# Patient Record
Sex: MALE | Race: WHITE | Employment: FULL TIME | URBAN - METROPOLITAN AREA
[De-identification: names, ages, dates, MRNs, and addresses within clinical notes are randomized per-mention and may not be internally consistent; named-entity substitution may affect disease eponyms.]

---

## 2017-02-13 PROBLEM — J43.0 UNILATERAL EMPHYSEMA (HCC): Chronic | Status: ACTIVE | Noted: 2017-02-13

## 2018-03-12 PROBLEM — N40.1 BPH ASSOCIATED WITH NOCTURIA: Status: ACTIVE | Noted: 2018-03-12

## 2018-03-12 PROBLEM — R35.1 BPH ASSOCIATED WITH NOCTURIA: Status: ACTIVE | Noted: 2018-03-12

## 2018-04-26 PROBLEM — J42 CHRONIC BRONCHITIS (HCC): Status: ACTIVE | Noted: 2018-04-26

## 2018-05-03 PROBLEM — E11.3291 MILD NONPROLIFERATIVE DIABETIC RETINOPATHY OF RIGHT EYE WITHOUT MACULAR EDEMA ASSOCIATED WITH TYPE 2 DIABETES MELLITUS (HCC): Status: ACTIVE | Noted: 2018-05-03

## 2018-08-14 PROBLEM — J96.11 CHRONIC RESPIRATORY FAILURE WITH HYPOXIA (HCC): Status: ACTIVE | Noted: 2018-08-14

## 2018-12-18 PROCEDURE — 81003 URINALYSIS AUTO W/O SCOPE: CPT | Performed by: UROLOGY

## 2019-03-21 PROBLEM — D49.4 BLADDER TUMOR: Status: ACTIVE | Noted: 2019-03-21

## 2019-04-02 PROBLEM — C67.2 CANCER OF LATERAL WALL OF URINARY BLADDER (HCC): Status: ACTIVE | Noted: 2019-03-21

## 2020-01-03 PROCEDURE — 88305 TISSUE EXAM BY PATHOLOGIST: CPT | Performed by: UROLOGY

## 2020-02-20 PROBLEM — D69.6 THROMBOCYTOPENIA, UNSPECIFIED (HCC): Status: ACTIVE | Noted: 2020-02-20

## 2020-02-20 PROBLEM — I48.91 ATRIAL FIBRILLATION, UNSPECIFIED TYPE (HCC): Status: ACTIVE | Noted: 2020-02-20

## 2021-12-30 PROBLEM — J96.11 CHRONIC RESPIRATORY FAILURE WITH HYPOXIA (HCC): Status: RESOLVED | Noted: 2018-08-14 | Resolved: 2021-12-30

## 2022-07-23 ENCOUNTER — LAB ENCOUNTER (OUTPATIENT)
Dept: LAB | Facility: HOSPITAL | Age: 82
End: 2022-07-23
Attending: PHYSICIAN ASSISTANT
Payer: MEDICARE

## 2022-07-23 DIAGNOSIS — Z20.822 ENCOUNTER FOR PREPROCEDURE SCREENING LABORATORY TESTING FOR COVID-19: ICD-10-CM

## 2022-07-23 DIAGNOSIS — Z01.812 ENCOUNTER FOR PREPROCEDURE SCREENING LABORATORY TESTING FOR COVID-19: ICD-10-CM

## 2022-07-23 LAB — SARS-COV-2 RNA RESP QL NAA+PROBE: NOT DETECTED

## 2022-07-26 ENCOUNTER — HOSPITAL ENCOUNTER (OUTPATIENT)
Dept: CT IMAGING | Facility: HOSPITAL | Age: 82
Discharge: HOME | End: 2022-07-26
Attending: PHYSICIAN ASSISTANT
Payer: MEDICARE

## 2022-07-26 ENCOUNTER — HOSPITAL ENCOUNTER (OUTPATIENT)
Dept: GENERAL RADIOLOGY | Facility: HOSPITAL | Age: 82
Discharge: HOME | End: 2022-07-26
Attending: PHYSICIAN ASSISTANT
Payer: MEDICARE

## 2022-07-26 VITALS
HEART RATE: 89 BPM | BODY MASS INDEX: 31.71 KG/M2 | OXYGEN SATURATION: 93 % | DIASTOLIC BLOOD PRESSURE: 83 MMHG | WEIGHT: 219 LBS | SYSTOLIC BLOOD PRESSURE: 152 MMHG | HEIGHT: 69.5 IN

## 2022-07-26 DIAGNOSIS — M21.372 LEFT FOOT DROP: ICD-10-CM

## 2022-07-26 PROCEDURE — 72132 CT LUMBAR SPINE W/DYE: CPT | Performed by: PHYSICIAN ASSISTANT

## 2022-07-26 PROCEDURE — 62304 MYELOGRAPHY LUMBAR INJECTION: CPT | Performed by: PHYSICIAN ASSISTANT

## 2022-07-26 NOTE — IMAGING NOTE
S; SCHEDULED L. MYELOGRAM  B;  NKDA. HX HTN & DM ON MEDS, A-FIB HAS A PACEMAKER. A;  VSS. STATES NO ASA X 5 DAYS, ALL HTN MEDS 3 DAYS AGO, DM MEDS YES. PT A&O, MALDONADO, SKIN-PALE . PT HAS  PRESENT.    R; NO CONTRADICTIONS FOR PROCEDURE.

## 2022-07-26 NOTE — IMAGING NOTE
Patient to recovery area at 1225 s/p myelogram. bandaide C/D/I. Monitoring for 1 hr then  To discharge home with family member. 1335: DISCHARGE INSTRUCTIONS GIVEN VIA AVS AND VERBALLY TO PATIENT. PT STATED UNDERSTANDING. BANDAIDE C/D/I. WHEELED OUT TO [facility] WHERE LENO IS WAITING WITH CAR.